# Patient Record
Sex: MALE | Race: WHITE | NOT HISPANIC OR LATINO
[De-identification: names, ages, dates, MRNs, and addresses within clinical notes are randomized per-mention and may not be internally consistent; named-entity substitution may affect disease eponyms.]

---

## 2022-08-25 ENCOUNTER — NON-APPOINTMENT (OUTPATIENT)
Age: 37
End: 2022-08-25

## 2022-08-30 ENCOUNTER — NON-APPOINTMENT (OUTPATIENT)
Age: 37
End: 2022-08-30

## 2023-07-11 ENCOUNTER — NON-APPOINTMENT (OUTPATIENT)
Age: 38
End: 2023-07-11

## 2023-07-11 ENCOUNTER — APPOINTMENT (OUTPATIENT)
Dept: OPHTHALMOLOGY | Facility: CLINIC | Age: 38
End: 2023-07-11
Payer: COMMERCIAL

## 2023-07-11 PROCEDURE — 92004 COMPRE OPH EXAM NEW PT 1/>: CPT

## 2023-08-25 ENCOUNTER — NON-APPOINTMENT (OUTPATIENT)
Age: 38
End: 2023-08-25

## 2023-09-09 ENCOUNTER — NON-APPOINTMENT (OUTPATIENT)
Age: 38
End: 2023-09-09

## 2024-03-09 ENCOUNTER — NON-APPOINTMENT (OUTPATIENT)
Age: 39
End: 2024-03-09

## 2024-03-10 PROBLEM — Z00.00 ENCOUNTER FOR PREVENTIVE HEALTH EXAMINATION: Status: ACTIVE | Noted: 2024-03-10

## 2024-03-11 ENCOUNTER — APPOINTMENT (OUTPATIENT)
Dept: ORTHOPEDIC SURGERY | Facility: CLINIC | Age: 39
End: 2024-03-11
Payer: COMMERCIAL

## 2024-03-11 VITALS — HEIGHT: 72 IN | WEIGHT: 225 LBS | BODY MASS INDEX: 30.48 KG/M2

## 2024-03-11 DIAGNOSIS — S69.90XA UNSPECIFIED INJURY OF UNSPECIFIED WRIST, HAND AND FINGER(S), INITIAL ENCOUNTER: ICD-10-CM

## 2024-03-11 DIAGNOSIS — Z87.891 PERSONAL HISTORY OF NICOTINE DEPENDENCE: ICD-10-CM

## 2024-03-11 DIAGNOSIS — Z78.9 OTHER SPECIFIED HEALTH STATUS: ICD-10-CM

## 2024-03-11 PROCEDURE — 99203 OFFICE O/P NEW LOW 30 MIN: CPT

## 2024-03-11 NOTE — IMAGING
[de-identified] : Right middle finger with mild swelling, +ttp at ulnar MCP. Able to flex and extend at MCP, PIP and DIP. Sensation intact throughout. <2sec cap refill.  Right hand radiographs with no fracture nor dislocation. (3-view) English

## 2024-03-11 NOTE — ASSESSMENT
[FreeTextEntry1] : Right middle finger MCP UCL sprain - reviewed radiographs and pathoanatomy with patient. Discussed management to consist of NSAIDs prn, OT, corby taping, ease into use.  F/u 6 weeks; consider OT if stiff

## 2024-03-11 NOTE — HISTORY OF PRESENT ILLNESS
[de-identified] : Age: 38 year M PMHx: none Hand Dominance: RHD Chief Complaint: Right hand pain s/p trauma early March 2024. Patient reports that he was doing work around the house when he lost his balance and fell into a wooden board, causing his fingers to forcibly split. Patient reports that he went to [facility] 03/10/24 and had radiographs performed that were benign. Patient reports that he is feeling much better today. Denies numbness/tingling. Trauma: yes Outside Imaging/Treatment: X-rays at 03/10/24 OTC Medications: none OT/PT: none Bracing: none Pain worse with: n/a Pain better with: n/a

## 2024-04-24 ENCOUNTER — OFFICE (OUTPATIENT)
Facility: LOCATION | Age: 39
Setting detail: OPHTHALMOLOGY
End: 2024-04-24
Payer: COMMERCIAL

## 2024-04-24 DIAGNOSIS — H31.29: ICD-10-CM

## 2024-04-24 DIAGNOSIS — H10.433: ICD-10-CM

## 2024-04-24 DIAGNOSIS — H16.223: ICD-10-CM

## 2024-04-24 PROCEDURE — 92004 COMPRE OPH EXAM NEW PT 1/>: CPT | Performed by: OPTOMETRIST

## 2024-04-24 PROCEDURE — 92250 FUNDUS PHOTOGRAPHY W/I&R: CPT | Performed by: OPTOMETRIST

## 2024-05-08 ENCOUNTER — OFFICE (OUTPATIENT)
Facility: LOCATION | Age: 39
Setting detail: OPHTHALMOLOGY
End: 2024-05-08
Payer: COMMERCIAL

## 2024-05-08 ENCOUNTER — RX ONLY (RX ONLY)
Age: 39
End: 2024-05-08

## 2024-05-08 DIAGNOSIS — H10.433: ICD-10-CM

## 2024-05-08 DIAGNOSIS — H16.223: ICD-10-CM

## 2024-05-08 PROCEDURE — 99213 OFFICE O/P EST LOW 20 MIN: CPT | Performed by: OPTOMETRIST

## 2024-05-08 ASSESSMENT — CONFRONTATIONAL VISUAL FIELD TEST (CVF)
OS_FINDINGS: FULL
OD_FINDINGS: FULL

## 2024-07-01 ENCOUNTER — OFFICE (OUTPATIENT)
Facility: LOCATION | Age: 39
Setting detail: OPHTHALMOLOGY
End: 2024-07-01
Payer: COMMERCIAL

## 2024-07-01 DIAGNOSIS — H16.223: ICD-10-CM

## 2024-07-01 DIAGNOSIS — H10.433: ICD-10-CM

## 2024-07-01 PROCEDURE — 99213 OFFICE O/P EST LOW 20 MIN: CPT | Performed by: OPTOMETRIST

## 2024-09-04 ENCOUNTER — OFFICE (OUTPATIENT)
Facility: LOCATION | Age: 39
Setting detail: OPHTHALMOLOGY
End: 2024-09-04
Payer: COMMERCIAL

## 2024-09-04 ENCOUNTER — RX ONLY (RX ONLY)
Age: 39
End: 2024-09-04

## 2024-09-04 DIAGNOSIS — H10.433: ICD-10-CM

## 2024-09-04 DIAGNOSIS — Z13.5: ICD-10-CM

## 2024-09-04 DIAGNOSIS — H31.29: ICD-10-CM

## 2024-09-04 DIAGNOSIS — H16.223: ICD-10-CM

## 2024-09-04 PROCEDURE — 92250 FUNDUS PHOTOGRAPHY W/I&R: CPT | Mod: GY | Performed by: OPTOMETRIST

## 2024-09-04 PROCEDURE — OPTOS FUNDUS PHOTOGRAPHY - NO FINDINGS: Performed by: OPTOMETRIST

## 2024-09-04 PROCEDURE — 92014 COMPRE OPH EXAM EST PT 1/>: CPT | Performed by: OPTOMETRIST

## 2024-09-04 ASSESSMENT — CONFRONTATIONAL VISUAL FIELD TEST (CVF)
OS_FINDINGS: FULL
OD_FINDINGS: FULL

## 2024-09-26 ENCOUNTER — OFFICE (OUTPATIENT)
Facility: LOCATION | Age: 39
Setting detail: OPHTHALMOLOGY
End: 2024-09-26
Payer: COMMERCIAL

## 2024-09-26 DIAGNOSIS — H00.025: ICD-10-CM

## 2024-09-26 DIAGNOSIS — H16.223: ICD-10-CM

## 2024-09-26 PROCEDURE — 99213 OFFICE O/P EST LOW 20 MIN: CPT | Performed by: OPTOMETRIST

## 2024-09-26 ASSESSMENT — CONFRONTATIONAL VISUAL FIELD TEST (CVF)
OS_FINDINGS: FULL
OD_FINDINGS: FULL

## 2024-09-26 ASSESSMENT — LID EXAM ASSESSMENTS: OS_ANGULAR_BLEPHARITIS: LLL LUL

## 2025-06-09 ENCOUNTER — NON-APPOINTMENT (OUTPATIENT)
Age: 40
End: 2025-06-09

## 2025-07-05 ENCOUNTER — OFFICE (OUTPATIENT)
Facility: LOCATION | Age: 40
Setting detail: OPHTHALMOLOGY
End: 2025-07-05
Payer: COMMERCIAL

## 2025-07-05 DIAGNOSIS — H10.433: ICD-10-CM

## 2025-07-05 DIAGNOSIS — Z13.5: ICD-10-CM

## 2025-07-05 DIAGNOSIS — H16.223: ICD-10-CM

## 2025-07-05 DIAGNOSIS — H31.29: ICD-10-CM

## 2025-07-05 PROCEDURE — 68761 CLOSE TEAR DUCT OPENING: CPT | Mod: 50 | Performed by: OPTOMETRIST

## 2025-07-05 PROCEDURE — 92250 FUNDUS PHOTOGRAPHY W/I&R: CPT | Mod: GY | Performed by: OPTOMETRIST

## 2025-07-05 PROCEDURE — 99213 OFFICE O/P EST LOW 20 MIN: CPT | Mod: 25 | Performed by: OPTOMETRIST

## 2025-07-05 ASSESSMENT — SUPERFICIAL PUNCTATE KERATITIS (SPK)
OS_SPK: 1+
OD_SPK: 1+

## 2025-07-05 ASSESSMENT — CONFRONTATIONAL VISUAL FIELD TEST (CVF)
OS_FINDINGS: FULL
OD_FINDINGS: FULL

## 2025-07-05 ASSESSMENT — TONOMETRY
OS_IOP_MMHG: 17
OD_IOP_MMHG: 17

## 2025-07-05 ASSESSMENT — LID EXAM ASSESSMENTS: OS_ANGULAR_BLEPHARITIS: LLL LUL

## 2025-07-08 ASSESSMENT — VISUAL ACUITY
OD_BCVA: 20/20
OS_BCVA: 20/20

## 2025-07-08 ASSESSMENT — KERATOMETRY
OD_K2POWER_DIOPTERS: 43.50
OS_K1POWER_DIOPTERS: 43.25
OS_K2POWER_DIOPTERS: 43.75
OS_AXISANGLE_DEGREES: 105
OD_K1POWER_DIOPTERS: 43.50
OD_AXISANGLE_DEGREES: 0.0

## 2025-07-08 ASSESSMENT — REFRACTION_AUTOREFRACTION
OS_CYLINDER: +0.50
OS_AXIS: 173
OD_CYLINDER: +0.75
OD_AXIS: 197
OS_SPHERE: -0.50
OD_SPHERE: -0.50